# Patient Record
Sex: FEMALE | Race: WHITE | Employment: OTHER | URBAN - METROPOLITAN AREA
[De-identification: names, ages, dates, MRNs, and addresses within clinical notes are randomized per-mention and may not be internally consistent; named-entity substitution may affect disease eponyms.]

---

## 2017-07-26 ENCOUNTER — HOSPITAL ENCOUNTER (OUTPATIENT)
Dept: LAB | Age: 82
Discharge: HOME | End: 2017-07-26
Payer: MEDICARE

## 2017-07-26 DIAGNOSIS — E78.2 MIXED HYPERLIPIDEMIA: Chronic | ICD-10-CM

## 2017-07-26 DIAGNOSIS — I10 ESSENTIAL HYPERTENSION: ICD-10-CM

## 2017-07-26 LAB
ALBUMIN SERPL BCP-MCNC: 3.5 G/DL (ref 3.2–4.6)
ALBUMIN/GLOB SERPL: 1.1 {RATIO} (ref 1.2–3.5)
ALP SERPL-CCNC: 66 U/L (ref 50–136)
ALT SERPL-CCNC: 19 U/L (ref 12–65)
ANION GAP BLD CALC-SCNC: 6 MMOL/L (ref 7–16)
AST SERPL W P-5'-P-CCNC: 19 U/L (ref 15–37)
BILIRUB DIRECT SERPL-MCNC: 0.1 MG/DL
BILIRUB SERPL-MCNC: 0.5 MG/DL (ref 0.2–1.1)
BUN SERPL-MCNC: 13 MG/DL (ref 8–23)
CALCIUM SERPL-MCNC: 9.2 MG/DL (ref 8.3–10.4)
CHLORIDE SERPL-SCNC: 105 MMOL/L (ref 98–107)
CHOLEST SERPL-MCNC: 233 MG/DL
CO2 SERPL-SCNC: 30 MMOL/L (ref 21–32)
CREAT SERPL-MCNC: 0.92 MG/DL (ref 0.6–1)
GLOBULIN SER CALC-MCNC: 3.2 G/DL (ref 2.3–3.5)
GLUCOSE SERPL-MCNC: 87 MG/DL (ref 65–100)
HDLC SERPL-MCNC: 78 MG/DL (ref 40–60)
HDLC SERPL: 3 {RATIO}
LDLC SERPL CALC-MCNC: 142 MG/DL
LIPID PROFILE,FLP: ABNORMAL
POTASSIUM SERPL-SCNC: 4.9 MMOL/L (ref 3.5–5.1)
PROT SERPL-MCNC: 6.7 G/DL (ref 6.3–8.2)
SODIUM SERPL-SCNC: 141 MMOL/L (ref 136–145)
TRIGL SERPL-MCNC: 65 MG/DL (ref 35–150)
VLDLC SERPL CALC-MCNC: 13 MG/DL (ref 6–23)

## 2017-07-26 PROCEDURE — 80048 BASIC METABOLIC PNL TOTAL CA: CPT | Performed by: FAMILY MEDICINE

## 2017-07-26 PROCEDURE — 80061 LIPID PANEL: CPT | Performed by: FAMILY MEDICINE

## 2017-07-26 PROCEDURE — 80076 HEPATIC FUNCTION PANEL: CPT | Performed by: FAMILY MEDICINE

## 2017-07-26 PROCEDURE — 36415 COLL VENOUS BLD VENIPUNCTURE: CPT | Performed by: FAMILY MEDICINE

## 2017-07-28 NOTE — PROGRESS NOTES
Please inform patient/POA  that cholesterol slightly high. Cut back on fried and fatty foods result(s)  .

## 2017-09-07 ENCOUNTER — HOSPITAL ENCOUNTER (OUTPATIENT)
Dept: ULTRASOUND IMAGING | Age: 82
Discharge: HOME | End: 2017-09-07
Attending: FAMILY MEDICINE
Payer: MEDICARE

## 2017-09-07 DIAGNOSIS — M79.89 LEFT LEG SWELLING: ICD-10-CM

## 2017-09-07 PROCEDURE — 93971 EXTREMITY STUDY: CPT

## 2017-09-08 NOTE — PROGRESS NOTES
Pt notified per Dr. Shaggy Corona that her us result was normal. And that she will need a appt if swelling persists.

## 2017-09-18 ENCOUNTER — HOSPITAL ENCOUNTER (OUTPATIENT)
Dept: CT IMAGING | Age: 82
Discharge: HOME | End: 2017-09-18
Attending: FAMILY MEDICINE
Payer: MEDICARE

## 2017-09-18 DIAGNOSIS — C56.2 MALIGNANT NEOPLASM OF LEFT OVARY (HCC): ICD-10-CM

## 2017-09-18 DIAGNOSIS — M79.89 LEG SWELLING: ICD-10-CM

## 2017-09-18 PROCEDURE — 74011636320 HC RX REV CODE- 636/320: Performed by: FAMILY MEDICINE

## 2017-09-18 PROCEDURE — 74011000258 HC RX REV CODE- 258: Performed by: FAMILY MEDICINE

## 2017-09-18 PROCEDURE — 74177 CT ABD & PELVIS W/CONTRAST: CPT

## 2017-09-18 RX ORDER — SODIUM CHLORIDE 0.9 % (FLUSH) 0.9 %
10 SYRINGE (ML) INJECTION
Status: COMPLETED | OUTPATIENT
Start: 2017-09-18 | End: 2017-09-18

## 2017-09-18 RX ADMIN — DIATRIZOATE MEGLUMINE AND DIATRIZOATE SODIUM 15 ML: 660; 100 LIQUID ORAL; RECTAL at 14:56

## 2017-09-18 RX ADMIN — Medication 10 ML: at 14:56

## 2017-09-18 RX ADMIN — SODIUM CHLORIDE 100 ML: 900 INJECTION, SOLUTION INTRAVENOUS at 14:56

## 2017-09-18 RX ADMIN — IOPAMIDOL 100 ML: 755 INJECTION, SOLUTION INTRAVENOUS at 14:55

## 2017-10-26 ENCOUNTER — HOSPITAL ENCOUNTER (OUTPATIENT)
Dept: MAMMOGRAPHY | Age: 82
Discharge: HOME | End: 2017-10-26
Attending: FAMILY MEDICINE
Payer: MEDICARE

## 2017-10-26 DIAGNOSIS — Z12.31 VISIT FOR SCREENING MAMMOGRAM: ICD-10-CM

## 2017-10-26 PROCEDURE — 77067 SCR MAMMO BI INCL CAD: CPT

## 2017-11-14 PROBLEM — I89.0 LYMPHEDEMA: Status: ACTIVE | Noted: 2017-11-14

## 2017-11-29 ENCOUNTER — HOSPITAL ENCOUNTER (OUTPATIENT)
Dept: PHYSICAL THERAPY | Age: 82
Discharge: HOME | End: 2017-11-29
Payer: MEDICARE

## 2017-11-29 DIAGNOSIS — I89.0 LYMPHEDEMA: ICD-10-CM

## 2017-11-29 PROCEDURE — G8990 OTHER PT/OT CURRENT STATUS: HCPCS

## 2017-11-29 PROCEDURE — G8991 OTHER PT/OT GOAL STATUS: HCPCS

## 2017-11-29 PROCEDURE — 97166 OT EVAL MOD COMPLEX 45 MIN: CPT

## 2017-11-29 PROCEDURE — 97140 MANUAL THERAPY 1/> REGIONS: CPT

## 2017-11-29 NOTE — THERAPY EVALUATION
Amira Gould  : 1935  [place]  Secondary: [address] at [facility]  [address], [facility] [address]  [phone]   [phone]         OUTPATIENT OCCUPATIONAL THERAPY: Initial Assessment and Daily Note 2017    ICD-10: Treatment Diagnosis: I89.0, lymphedema not elsewhere classified  Precautions/Allergies:   Review of patient's allergies indicates no known allergies. Fall Risk Score: 0 (? 5 = High Risk)  MD Orders: Evaluate and treat MEDICAL/REFERRING DIAGNOSIS:   Lymphedema [I89.0]   DATE OF ONSET:  Chronic   REFERRING PHYSICIAN: Miguel Hadren NP  RETURN PHYSICIAN APPOINTMENT: Not provided     INITIAL ASSESSMENT:  Ms. Zack Wetzel presents with L LE lymphedema, with reported increase in swelling the past 4 months. Pt stated she had ovarian cancer in  with removal of lymph nodes, chemotherapy but no radiation. She has history of R total knee replacement. She recently saw a vein specialist, who sent her to us for complete decongestive therapy. Upon medical record review, pt had lymphedema therapy evaluation in , at which time she reported having swelling in her left leg for \"8 years. \"  Pt and  reported that pt has not had any treatment for her lymphedema. She presents with hyperkeratosis, fibrosis, 3+/5 pitting edema. Pt has purchased thigh highs but has difficulty donning and doffing the stockings. They are here to learn to manage lymphedema, reduce girth and improve functional mobility. PLAN OF CARE:   PROBLEM LIST:  1.  Edema/girth  2. Limb heaviness   INTERVENTIONS PLANNED:  Hygiene training; skin integrity management  Manual therapy training; manual lymphatic drainage  Therapeutic exercise  Multilayer bandaging  Compression garment fitting and management  Kineseotape/compression pump prn     TREATMENT PLAN:  Effective Dates: 17 TO 18.   Frequency/Duration:  Two times a week for 12 weeks; upon reassessment will adjust frequency and duration as necessary. GOALS: (Goals have been discussed and agreed upon with patient.)  Short-Term Functional Goals: Time Frame: 6 weeks  1. Patient will verbalize understanding of lymphedema precautions  2. Patient will be independent in skin care to reduce risk of cellulitis  3. Patient will be independent with home exercise program  4. Patient will tolerate multilayer compression bandaging to aid in edema reduction  Discharge Goals: Time Frame: 12 weeks  1..  Pt will have reduction and stabilization of circumferencial volumetric graph measurements left lower extremeity  1. Patient is independent with donning and doffing compression garments for long term management  3. Patient is independent with home management of lymphedema    Rehabilitation Potential For Stated Goals: Good  Regarding Lisa Shaikh's therapy, I certify that the treatment plan above will be carried out by a therapist or under their direction. Thank you for this referral,  Lamine Lacy, OTR/L, CLT     Referring Physician Signature: Adi Harden NP _________________________  Date _________            The information in this section was collected on 11/29/2017   (except where otherwise noted). OCCUPATIONAL PROFILE & HISTORY:   History of Present Injury/Illness (Reason for Referral):   Ms. Juliocesar Ferreira presents with L LE lymphedema, with reported increase in swelling the past 4 months. Pt stated she had ovarian cancer in 2000 with chemotherapy but no radiation. She has history of R total knee replacement. She recently saw a vein specialist, who sent her to us for complete decongestive therapy. Upon medical record review, pt had lymphedema treatment in 2014, at which time she reported having swelling in her left leg for \"8 years. \"  Pt and  reported that pt has not had any treatment for her lymphedema and that it has been present for 4 to 6 months.   Pt has purchased thigh highs but has difficulty donning and doffing the stockings. They are here to learn to manage lymphedema, reduce girth and improve functional mobility. Past Medical History/Comorbidities:   Ms. Tran Wolfe  has a past medical history of Childhood asthma; Chronic pain of right knee; Cognitive impairment; DJD (degenerative joint disease) of knee; H/O: whooping cough; History of ovarian cancer (1999); History of scarlet fever (\"one day\" as a child); Hypertension; Memory loss; and Sigmoid diverticulosis. She also has no past medical history of Adverse effect of anesthesia; Difficult intubation; Malignant hyperthermia due to anesthesia; Nausea & vomiting; or Pseudocholinesterase deficiency. Ms. Tran Wolfe  has a past surgical history that includes cholecystectomy; appendectomy; total abdominal hysterectomy; tonsillectomy (1948); knee arthroscopy (Right, 2016); and knee replacement (Right, 12/2016). Social History/Living Environment:   Pt lives with her spouse. She swims 2-3 days a week  Prior Level of Function/Work/Activity:  Independent; retired    Current Medications:    Current Outpatient Prescriptions:     lisinopril (PRINIVIL, ZESTRIL) 20 mg tablet, TAKE ONE TABLET BY MOUTH ONCE DAILY, Disp: 90 Tab, Rfl: 3    Comp. Stocking,Thigh,Reg,Medium misc, One pair thigh high stocking- please measure to fit, Disp: 1 Box, Rfl: 1    CALCIUM CARBONATE/VITAMIN D3 (CALCIUM + D PO), Take  by mouth every morning. Indications: hold until after surgery, Disp: , Rfl:     ranitidine (ZANTAC) 150 mg tablet, Take 150 mg by mouth two (2) times a day., Disp: , Rfl:     ascorbic acid (VITAMIN C) 500 mg tablet, Take 500 mg by mouth every morning. Indications: hold until after surgery, Disp: , Rfl:     multivitamin (ONE A DAY) tablet, Take 1 Tab by mouth every morning. Indications: hold until after surgery, Disp: , Rfl:     CYANOCOBALAMIN, VITAMIN B-12, (VITAMIN B-12 PO), Take  by mouth every morning.  Indications: hold until after surgery, Disp: , Rfl:     GLUCOSAMINE HCL/CHONDR CARTER A NA (GLUCOSAMINE-CHONDROITIN) 750-600 mg tab, Take  by mouth every morning. Indications: hold until after surgery, Disp: , Rfl:     aspirin 81 mg tablet, Take 81 mg by mouth every morning. Indications: continue, Disp: , Rfl:             Date Last Reviewed:  11/29/2017     Complexity Level : Expanded review of therapy/medical records (1-2):  MODERATE COMPLEXITY   ASSESSMENT OF OCCUPATIONAL PERFORMANCE:   Palpation:          Pt with 3+/5 pitting edema L LE; edema foot to above knee. Skin Integrity:          Pt with  hyperkeratosis and lymphostatic fibrosis, No hyperpigmentation, ulceration or weeping. Edema/Girth:  3+ and pitting    Left Right    Initial Most Recent Initial Most Recent   Lower  Extremity  189.5 cm  Foot to above knee circumferencial volumetric measuements                   Physical Skills Involved:  1. Edema  2. Skin Integrity  3. Limb heaviness Cognitive Skills Affected (resulting in the inability to perform in a timely and safe manner):  1. Short Term Recall Psychosocial Skills Affected:  1. Habits/Routines   Number of elements that affect the Plan of Care: 3-5:  MODERATE COMPLEXITY   CLINICAL DECISION MAKING:   Outcome Measure: Tool Used: Lymphedema Life Impact Scale   Score:  Initial: 0 Most Recent: X (Date: -- )   Interpretation of Score: The Lymphedema Life Impact Scale (LLIS) is a validated instrument that measures the physical, functional, and psychosocial concerns pertinent to patients with extremity lymphedema. The Scale's questionnaire is administered to patients to gauge impairments, activity limitations, and participation restrictions resulting from their lymphedema. Score 0 1-13 14-26 27-40 41-54 55-67 68   Modifier CH CI CJ CK CL CM CN     ?  Other PT/OT Primary Functional Limitations:     - CURRENT STATUS:  - 0% impaired, limited or restricted    - GOAL STATUS: CH - 0% impaired, limited or restricted    - D/C STATUS:  ---------------To be determined---------------      Medical Necessity:   · Patient is expected to demonstrate progress in reduction and stabilization of L LE lymphedema to decrease risk for cellulitis. Reason for Services/Other Comments:  · Patient continues to require skilled intervention due to stage 2 lymphedema L LE. Assessment process, impact of co-morbidities, assessment modification\need for assistance, and selection of interventions: Analytical Complexity:MODERATE COMPLEXITY   TREATMENT:   (In addition to Assessment/Re-Assessment sessions the following treatments were rendered)    Pre-treatment Symptoms/Complaints:  Pt presents with increased girth, limb heaviness effecting functional mobility  Pain: Initial: Pain Intensity 1: 1  Post Session:  1       Occupational Therapy Evaluation (x) OT eval completed. Pt received information on lymphedema and risk reduction/self management practices as outlined by the National Lymphedema Network. Therapeutic Exercise: ():      Manual Therapy: (30 minutes):   Pt was educated on lymphatic pathophysiology, complete decongestive therapy, precautions and skin integrity management. Provided surgigrip stockinette for compression as pt is going out of town and will return for therapy December 11. Pt has thigh high hose to wear for compression; pt/spouse verbalized understanding of precautions and skin integrity management. Pt in agreement with POC and will begin treatment the week of Dec 11th. Treatment/Session Assessment:    · Response to Treatment:  Tolerated without complication. In agreement with POC. · Compliance with Program/Exercises: Will assess as treatment progresses. · Recommendations/Intent for next treatment session: \"Next visit will focus on complete decongestive therapy\".   Total Treatment Duration:OT Patient Time In/Time Out  Time In: 1000  Time Out: [address] [place] [address]

## 2017-12-12 ENCOUNTER — HOSPITAL ENCOUNTER (OUTPATIENT)
Dept: PHYSICAL THERAPY | Age: 82
Discharge: HOME | End: 2017-12-12
Payer: MEDICARE

## 2017-12-12 PROCEDURE — 97140 MANUAL THERAPY 1/> REGIONS: CPT

## 2017-12-12 NOTE — PROGRESS NOTES
Luis Antonio Rivera  : 1935  [place]  Secondary: [address] at [facility]  [address], [address]  [phone]   [phone]         OUTPATIENT OCCUPATIONAL THERAPY: Daily Note 2017    ICD-10: Treatment Diagnosis: I89.0, lymphedema not elsewhere classified  Precautions/Allergies:   Review of patient's allergies indicates no known allergies. Fall Risk Score: 0 (? 5 = High Risk)  MD Orders: Evaluate and treat MEDICAL/REFERRING DIAGNOSIS:   Lymphedema, not elsewhere classified [I89.0]   DATE OF ONSET:  Chronic   REFERRING PHYSICIAN: Darien Harden NP  RETURN PHYSICIAN APPOINTMENT: Not provided     INITIAL ASSESSMENT:  Ms. Freddy Villalpando presents with L LE lymphedema, with reported increase in swelling the past 4 months. Pt stated she had ovarian cancer in  with chemotherapy but no radiation. She has history of R total knee replacement. She recently saw a vein specialist, who sent her to us for complete decongestive therapy. Upon medical record review, pt had lymphedema treatment in , at which time she reported having swelling in her left leg for \"8 years. \"  Pt and  reported that pt has not had any treatment for her lymphedema and that it has been present for 4 to 6 months. Pt has purchased thigh highs but has difficulty donning and doffing the stockings. They are here to learn to manage lymphedema, reduce girth and improve functional mobility. PLAN OF CARE:   PROBLEM LIST:  1.  Edema/girth  2. Limb heaviness   INTERVENTIONS PLANNED:  Hygiene training; skin integrity management  Manual therapy training; manual lymphatic drainage  Therapeutic exercise  Multilayer bandaging  Compression garment fitting and management  Kineseotape/compression pump prn     TREATMENT PLAN:  Effective Dates: 17 TO 18.   Frequency/Duration:  Two times a week for 12 weeks; upon reassessment will adjust frequency and duration as necessary. GOALS: (Goals have been discussed and agreed upon with patient.)  Short-Term Functional Goals: Time Frame: 6 weeks  1. Patient will verbalize understanding of lymphedema precautions  2. Patient will be independent in skin care to reduce risk of cellulitis  3. Patient will be independent with home exercise program  4. Patient will tolerate multilayer compression bandaging to aid in edema reduction  Discharge Goals: Time Frame: 12 weeks  1..  Pt will have reduction and stabilization of circumferencial volumetric graph measurements left lower extremeity  1. Patient is independent with donning and doffing compression garments for long term management  3. Patient is independent with home management of lymphedema    Rehabilitation Potential For Stated Goals: Good  Regarding Dev Shaikh's therapy, I certify that the treatment plan above will be carried out by a therapist or under their direction. Thank you for this referral,  Madisyn Isidro OTR/L, CLT     Referring Physician Signature: Jennifer Harden, DONNELL _________________________  Date _________            The information in this section was collected on 12/12/2017   (except where otherwise noted). OCCUPATIONAL PROFILE & HISTORY:   History of Present Injury/Illness (Reason for Referral):   Ms. Carlo Garcia presents with L LE lymphedema, with reported increase in swelling the past 4 months. Pt stated she had ovarian cancer in 2000 with chemotherapy but no radiation. She has history of R total knee replacement. She recently saw a vein specialist, who sent her to us for complete decongestive therapy. Upon medical record review, pt had lymphedema treatment in 2014, at which time she reported having swelling in her left leg for \"8 years. \"  Pt and  reported that pt has not had any treatment for her lymphedema and that it has been present for 4 to 6 months.   Pt has purchased thigh highs but has difficulty donning and doffing the stockings. They are here to learn to manage lymphedema, reduce girth and improve functional mobility. Past Medical History/Comorbidities:   Ms. Rachael Hwang  has a past medical history of Childhood asthma; Chronic pain of right knee; Cognitive impairment; DJD (degenerative joint disease) of knee; H/O: whooping cough; History of ovarian cancer (1999); History of scarlet fever (\"one day\" as a child); Hypertension; Memory loss; and Sigmoid diverticulosis. She also has no past medical history of Adverse effect of anesthesia; Difficult intubation; Malignant hyperthermia due to anesthesia; Nausea & vomiting; or Pseudocholinesterase deficiency. Ms. Rachael Hwang  has a past surgical history that includes cholecystectomy; appendectomy; total abdominal hysterectomy; tonsillectomy (1948); knee arthroscopy (Right, 2016); and knee replacement (Right, 12/2016). Social History/Living Environment:   Pt lives with her spouse. She swims 2-3 days a week  Prior Level of Function/Work/Activity:  Independent; retired    Current Medications:    Current Outpatient Prescriptions:     donepezil (ARICEPT) 10 mg tablet, Take 10 mg by mouth nightly., Disp: , Rfl:     lisinopril (PRINIVIL, ZESTRIL) 20 mg tablet, TAKE ONE TABLET BY MOUTH ONCE DAILY, Disp: 90 Tab, Rfl: 3    Comp. Stocking,Thigh,Reg,Medium misc, One pair thigh high stocking- please measure to fit, Disp: 1 Box, Rfl: 1    CALCIUM CARBONATE/VITAMIN D3 (CALCIUM + D PO), Take  by mouth every morning. Indications: hold until after surgery, Disp: , Rfl:     ranitidine (ZANTAC) 150 mg tablet, Take 150 mg by mouth two (2) times a day., Disp: , Rfl:     ascorbic acid (VITAMIN C) 500 mg tablet, Take 500 mg by mouth every morning. Indications: hold until after surgery, Disp: , Rfl:     multivitamin (ONE A DAY) tablet, Take 1 Tab by mouth every morning.  Indications: hold until after surgery, Disp: , Rfl:     CYANOCOBALAMIN, VITAMIN B-12, (VITAMIN B-12 PO), Take  by mouth every morning. Indications: hold until after surgery, Disp: , Rfl:     GLUCOSAMINE HCL/CHONDR CARTER A NA (GLUCOSAMINE-CHONDROITIN) 750-600 mg tab, Take  by mouth every morning. Indications: hold until after surgery, Disp: , Rfl:     aspirin 81 mg tablet, Take 81 mg by mouth every morning. Indications: continue, Disp: , Rfl:             Date Last Reviewed:  12/12/2017     Complexity Level : Expanded review of therapy/medical records (1-2):  MODERATE COMPLEXITY   ASSESSMENT OF OCCUPATIONAL PERFORMANCE:   Palpation:          Pt with 3+/5 pitting edema L LE; edema foot to above knee. Skin Integrity:          Pt with no hyperpigmentation, fibrosis, ulceration or weeping. Edema/Girth:  3+ and pitting    Left Right    Initial Most Recent Initial Most Recent   Lower  Extremity  189.5 cm  Foot to above knee circumferencial volumetric measuements                   Physical Skills Involved:  1. Edema  2. Skin Integrity  3. Limb heaviness Cognitive Skills Affected (resulting in the inability to perform in a timely and safe manner):  1. Short Term Recall Psychosocial Skills Affected:  1. Habits/Routines   Number of elements that affect the Plan of Care: 3-5:  MODERATE COMPLEXITY   CLINICAL DECISION MAKING:   Outcome Measure: Tool Used: Lymphedema Life Impact Scale   Score:  Initial: 0 Most Recent: X (Date: -- )   Interpretation of Score: The Lymphedema Life Impact Scale (LLIS) is a validated instrument that measures the physical, functional, and psychosocial concerns pertinent to patients with extremity lymphedema. The Scale's questionnaire is administered to patients to gauge impairments, activity limitations, and participation restrictions resulting from their lymphedema. Score 0 1-13 14-26 27-40 41-54 55-67 68   Modifier CH CI CJ CK CL CM CN     ?  Other PT/OT Primary Functional Limitations:     - CURRENT STATUS: CH - 0% impaired, limited or restricted    - GOAL STATUS: CH - 0% impaired, limited or restricted    - D/C STATUS:  ---------------To be determined---------------      Medical Necessity:   · Patient is expected to demonstrate progress in reduction and stabilization of L LE lymphedema to decrease risk for cellulitis. Reason for Services/Other Comments:  · Patient continues to require skilled intervention due to stage 1 L LE lymphedema. Assessment process, impact of co-morbidities, assessment modification\need for assistance, and selection of interventions: Analytical Complexity:MODERATE COMPLEXITY   TREATMENT:   (In addition to Assessment/Re-Assessment sessions the following treatments were rendered)    Pre-treatment Symptoms/Complaints:  Pt presents with increased girth, limb heaviness effecting functional mobility  Pain: Initial: Pain Intensity 1: 0  Post Session:  0       Occupational Therapy Evaluation (x) OT eval completed. Pt received information on lymphedema and risk reduction/self management practices as outlined by the National Lymphedema Network. Therapeutic Exercise: ():      Manual Therapy:nn  60 minutes): Jp Pascal Pt arrived with thigh high on. Manual lymphatic drainage with facilitation of cervical, axillary, inguinal collectors prior to decongestion of L LE. Pump trial x 15 minutes. Pt may benefit from pump for home use due to chronic nature of lymphedema in L LE. Multilayer bandaging using surgigrip and 2 short stretch bandages from foot to knee. Pt to wear until Thursday or Friday, and remove for aquatic exercises. Pt will wear thigh high and RTC next week 2x a week for complete decongestive therapy . Treatment/Session Assessment:    · Response to Treatment:  Tolerated without complication. Pt has purchased Eucerin lotion and is using at home. Interested in pump for home use; will contact pump rep for follow through with request.  · Compliance with Program/Exercises:  Good compliance  · Recommendations/Intent for next treatment session:  \"Next visit will focus on complete decongestive therapy\".   Total Treatment Duration:OT Patient Time In/Time Out  Time In: 1200  Time Out: Delilah Wen 96, OT

## 2017-12-20 ENCOUNTER — HOSPITAL ENCOUNTER (OUTPATIENT)
Dept: PHYSICAL THERAPY | Age: 82
Discharge: HOME | End: 2017-12-20
Payer: MEDICARE

## 2017-12-20 PROCEDURE — 97140 MANUAL THERAPY 1/> REGIONS: CPT

## 2017-12-20 NOTE — PROGRESS NOTES
Aliya Lozada  : 1935  [place]  Secondary: [address] at [facility]  [address], [address]  [phone]   senior [phone]         OUTPATIENT OCCUPATIONAL THERAPY: Daily Note 2017    ICD-10: Treatment Diagnosis: I89.0, lymphedema not elsewhere classified  Precautions/Allergies:   Review of patient's allergies indicates no known allergies. Fall Risk Score: 0 (? 5 = High Risk)  MD Orders: Evaluate and treat MEDICAL/REFERRING DIAGNOSIS:   Lymphedema, not elsewhere classified [I89.0]   DATE OF ONSET:  Chronic   REFERRING PHYSICIAN: Jennifer Harden NP  RETURN PHYSICIAN APPOINTMENT: Not provided     INITIAL ASSESSMENT:   Ms. Carlo Garcia presents with L LE lymphedema, with reported increase in swelling the past 4 months. Pt stated she had ovarian cancer in  with removal of lymph nodes, chemotherapy but no radiation. She has history of R total knee replacement. She recently saw a vein specialist, who sent her to us for complete decongestive therapy. Upon medical record review, pt had lymphedema therapy evaluation in , at which time she reported having swelling in her left leg for \"8 years. \"  Pt and  reported that pt has not had any treatment for her lymphedema. She presents with hyperkeratosis, fibrosis, 3+/5 pitting edema. Pt has purchased thigh highs but has difficulty donning and doffing the stockings. They are here to learn to manage lymphedema, reduce girth and improve functional mobility. PLAN OF CARE:   PROBLEM LIST:  1.  Edema/girth  2. Limb heaviness   INTERVENTIONS PLANNED:  Hygiene training; skin integrity management  Manual therapy training; manual lymphatic drainage  Therapeutic exercise  Multilayer bandaging  Compression garment fitting and management  Kineseotape/compression pump prn     TREATMENT PLAN:  Effective Dates: 17 TO 18.   Frequency/Duration:  Two times a week for 12 weeks; upon reassessment will adjust frequency and duration as necessary. GOALS: (Goals have been discussed and agreed upon with patient.)  Short-Term Functional Goals: Time Frame: 6 weeks  1. Patient will verbalize understanding of lymphedema precautions  2. Patient will be independent in skin care to reduce risk of cellulitis  3. Patient will be independent with home exercise program  4. Patient will tolerate multilayer compression bandaging to aid in edema reduction  Discharge Goals: Time Frame: 12 weeks  1..  Pt will have reduction and stabilization of circumferencial volumetric graph measurements left lower extremeity  1. Patient is independent with donning and doffing compression garments for long term management  3. Patient is independent with home management of lymphedema    Rehabilitation Potential For Stated Goals: Gregg Oquendo, OTR/L, CLT                 The information in this section was collected on 12/20/2017   (except where otherwise noted). OCCUPATIONAL PROFILE & HISTORY:   History of Present Injury/Illness (Reason for Referral):   Ms. Lindsay Anton presents with L LE lymphedema, with reported increase in swelling the past 4 months. Pt stated she had ovarian cancer in 2000 with chemotherapy but no radiation. She has history of R total knee replacement. She recently saw a vein specialist, who sent her to us for complete decongestive therapy. Upon medical record review, pt had lymphedema treatment in 2014, at which time she reported having swelling in her left leg for \"8 years. \"  Pt and  reported that pt has not had any treatment for her lymphedema and that it has been present for 4 to 6 months. Pt has purchased thigh highs but has difficulty donning and doffing the stockings. They are here to learn to manage lymphedema, reduce girth and improve functional mobility.     Past Medical History/Comorbidities:   Ms. Lindsay Anton  has a past medical history of Childhood asthma; Chronic pain of right knee; Cognitive impairment; DJD (degenerative joint disease) of knee; H/O: whooping cough; History of ovarian cancer (1999); History of scarlet fever (\"one day\" as a child); Hypertension; Memory loss; and Sigmoid diverticulosis. She also has no past medical history of Adverse effect of anesthesia; Difficult intubation; Malignant hyperthermia due to anesthesia; Nausea & vomiting; or Pseudocholinesterase deficiency. Ms. Freddy Villalpando  has a past surgical history that includes hx cholecystectomy; hx appendectomy; hx total abdominal hysterectomy; hx tonsillectomy (1948); hx knee arthroscopy (Right, 2016); and hx knee replacement (Right, 12/2016). Social History/Living Environment:   Pt lives with her spouse. She swims 2-3 days a week  Prior Level of Function/Work/Activity:  Independent; retired    Current Medications:    Current Outpatient Prescriptions:     donepezil (ARICEPT) 10 mg tablet, Take 10 mg by mouth nightly., Disp: , Rfl:     lisinopril (PRINIVIL, ZESTRIL) 20 mg tablet, TAKE ONE TABLET BY MOUTH ONCE DAILY, Disp: 90 Tab, Rfl: 3    Comp. Stocking,Thigh,Reg,Medium misc, One pair thigh high stocking- please measure to fit, Disp: 1 Box, Rfl: 1    CALCIUM CARBONATE/VITAMIN D3 (CALCIUM + D PO), Take  by mouth every morning. Indications: hold until after surgery, Disp: , Rfl:     ranitidine (ZANTAC) 150 mg tablet, Take 150 mg by mouth two (2) times a day., Disp: , Rfl:     ascorbic acid (VITAMIN C) 500 mg tablet, Take 500 mg by mouth every morning. Indications: hold until after surgery, Disp: , Rfl:     multivitamin (ONE A DAY) tablet, Take 1 Tab by mouth every morning. Indications: hold until after surgery, Disp: , Rfl:     CYANOCOBALAMIN, VITAMIN B-12, (VITAMIN B-12 PO), Take  by mouth every morning. Indications: hold until after surgery, Disp: , Rfl:     GLUCOSAMINE HCL/CHONDR CARTER A NA (GLUCOSAMINE-CHONDROITIN) 750-600 mg tab, Take  by mouth every morning.  Indications: hold until after surgery, Disp: , Rfl:     aspirin 81 mg tablet, Take 81 mg by mouth every morning. Indications: continue, Disp: , Rfl:             Date Last Reviewed:  12/20/2017     Complexity Level : Expanded review of therapy/medical records (1-2):  MODERATE COMPLEXITY   ASSESSMENT OF OCCUPATIONAL PERFORMANCE:   Palpation:          Pt with 3+/5 pitting edema L LE; edema foot to above knee. Skin Integrity:          Pt with hyperkeratosis, lymphostatic fibrosis;  no hyperpigmentation, ulceration or weeping. Edema/Girth:  3+ and pitting    Left Right    Initial Most Recent Initial Most Recent   Lower  Extremity  189.5 cm  Foot to above knee circumferencial volumetric measuements                   Physical Skills Involved:  1. Edema  2. Skin Integrity  3. Limb heaviness Cognitive Skills Affected (resulting in the inability to perform in a timely and safe manner):  1. Short Term Recall Psychosocial Skills Affected:  1. Habits/Routines   Number of elements that affect the Plan of Care: 3-5:  MODERATE COMPLEXITY   CLINICAL DECISION MAKING:   Outcome Measure: Tool Used: Lymphedema Life Impact Scale   Score:  Initial: 0 Most Recent: X (Date: -- )   Interpretation of Score: The Lymphedema Life Impact Scale (LLIS) is a validated instrument that measures the physical, functional, and psychosocial concerns pertinent to patients with extremity lymphedema. The Scale's questionnaire is administered to patients to gauge impairments, activity limitations, and participation restrictions resulting from their lymphedema. Score 0 1-13 14-26 27-40 41-54 55-67 68   Modifier CH CI CJ CK CL CM CN     ?  Other PT/OT Primary Functional Limitations:     - CURRENT STATUS:  - 0% impaired, limited or restricted    - GOAL STATUS: CH - 0% impaired, limited or restricted    - D/C STATUS:  ---------------To be determined---------------      Medical Necessity:   · Patient is expected to demonstrate progress in reduction and stabilization of L LE lymphedema to decrease risk for cellulitis. Reason for Services/Other Comments:  · Patient continues to require skilled intervention due to stage 2 L LE lymphedema. Assessment process, impact of co-morbidities, assessment modification\need for assistance, and selection of interventions: Analytical Complexity:MODERATE COMPLEXITY   TREATMENT:   (In addition to Assessment/Re-Assessment sessions the following treatments were rendered)    Pre-treatment Symptoms/Complaints:  Pt presents with increased girth, limb heaviness effecting functional mobility  Pain: Initial: Pain Intensity 1: 0  Post Session:  0       Occupational Therapy Evaluation (x) OT eval completed. Pt received information on lymphedema and risk reduction/self management practices as outlined by the National Lymphedema Network. Therapeutic Exercise: ():      Manual Therapy:nn  60 minutes): Jamison Eng Pt arrived with thigh high on. Manual lymphatic drainage with facilitation of cervical, axillary, inguinal collectors prior to decongestion of L LE. Pump trial x 15 minutes. Pt may benefit from pump for home use due to chronic nature of lymphedema in L LE. Multilayer bandaging using surgigrip and 2 short stretch bandages from foot to knee. Pt to wear until Thursday and remove for aquatic exercises. Pt will wear thigh high and RTC Friday for complete decongestive therapy . Treatment/Session Assessment:    · Response to Treatment:  Tolerated without complication. Pt has purchased Eucerin lotion and is using at home. Interested in pump for home use; will contact pump rep for follow through with request.  · Compliance with Program/Exercises:  Good compliance  · Recommendations/Intent for next treatment session: \"Next visit will focus on complete decongestive therapy\".   Total Treatment Duration:OT Patient Time In/Time Out  Time In: 1200  Time Out: Delilah Wen 96, OT

## 2017-12-22 ENCOUNTER — HOSPITAL ENCOUNTER (OUTPATIENT)
Dept: PHYSICAL THERAPY | Age: 82
Discharge: HOME | End: 2017-12-22
Payer: MEDICARE

## 2017-12-22 PROCEDURE — 97140 MANUAL THERAPY 1/> REGIONS: CPT

## 2017-12-22 NOTE — PROGRESS NOTES
Isrrael Garcia  : 1935  [place]  Secondary: [address] at [place] [address] Therapy  [address] [place] [address], [facility] [address]  [phone]   [phone]         OUTPATIENT OCCUPATIONAL THERAPY: Daily Note 2017    ICD-10: Treatment Diagnosis: I89.0, lymphedema not elsewhere classified  Precautions/Allergies:   Review of patient's allergies indicates no known allergies. Fall Risk Score: 0 (? 5 = High Risk)  MD Orders: Evaluate and treat MEDICAL/REFERRING DIAGNOSIS:   Lymphedema, not elsewhere classified [I89.0]   DATE OF ONSET:  Chronic   REFERRING PHYSICIAN: Jamil Harden NP  RETURN PHYSICIAN APPOINTMENT: Not provided     INITIAL ASSESSMENT:   Ms. Maurisio Bravo presents with L LE lymphedema, with reported increase in swelling the past 4 months. Pt stated she had ovarian cancer in  with removal of lymph nodes, chemotherapy but no radiation. She has history of R total knee replacement. She recently saw a vein specialist, who sent her to us for complete decongestive therapy. Upon medical record review, pt had lymphedema therapy evaluation in , at which time she reported having swelling in her left leg for \"8 years. \"  Pt and  reported that pt has not had any treatment for her lymphedema. She presents with hyperkeratosis, fibrosis, 3+/5 pitting edema. Pt has purchased thigh highs but has difficulty donning and doffing the stockings. They are here to learn to manage lymphedema, reduce girth and improve functional mobility. PLAN OF CARE:   PROBLEM LIST:  1.  Edema/girth  2. Limb heaviness   INTERVENTIONS PLANNED:  Hygiene training; skin integrity management  Manual therapy training; manual lymphatic drainage  Therapeutic exercise  Multilayer bandaging  Compression garment fitting and management  Kineseotape/compression pump prn     TREATMENT PLAN:  Effective Dates: 17 TO 18.   Frequency/Duration:  Two times a week for 12 weeks; upon reassessment will adjust frequency and duration as necessary. GOALS: (Goals have been discussed and agreed upon with patient.)  Short-Term Functional Goals: Time Frame: 6 weeks  1. Patient will verbalize understanding of lymphedema precautions  2. Patient will be independent in skin care to reduce risk of cellulitis  3. Patient will be independent with home exercise program  4. Patient will tolerate multilayer compression bandaging to aid in edema reduction  Discharge Goals: Time Frame: 12 weeks  1..  Pt will have reduction and stabilization of circumferencial volumetric graph measurements left lower extremeity  1. Patient is independent with donning and doffing compression garments for long term management  3. Patient is independent with home management of lymphedema    Rehabilitation Potential For Stated Goals: Gregg Newton, OTR/L, CLT                 The information in this section was collected on 12/22/2017   (except where otherwise noted). OCCUPATIONAL PROFILE & HISTORY:   History of Present Injury/Illness (Reason for Referral):   Ms. Tere Lopez presents with L LE lymphedema, with reported increase in swelling the past 4 months. Pt stated she had ovarian cancer in 2000 with chemotherapy but no radiation. She has history of R total knee replacement. She recently saw a vein specialist, who sent her to us for complete decongestive therapy. Upon medical record review, pt had lymphedema treatment in 2014, at which time she reported having swelling in her left leg for \"8 years. \"  Pt and  reported that pt has not had any treatment for her lymphedema and that it has been present for 4 to 6 months. Pt has purchased thigh highs but has difficulty donning and doffing the stockings. They are here to learn to manage lymphedema, reduce girth and improve functional mobility.     Past Medical History/Comorbidities:   Ms. Tere Lopez  has a past medical history of Childhood asthma; Chronic pain of right knee; Cognitive impairment; DJD (degenerative joint disease) of knee; H/O: whooping cough; History of ovarian cancer (1999); History of scarlet fever (\"one day\" as a child); Hypertension; Memory loss; and Sigmoid diverticulosis. She also has no past medical history of Adverse effect of anesthesia; Difficult intubation; Malignant hyperthermia due to anesthesia; Nausea & vomiting; or Pseudocholinesterase deficiency. Ms. Mary Dominguez  has a past surgical history that includes hx cholecystectomy; hx appendectomy; hx total abdominal hysterectomy; hx tonsillectomy (1948); hx knee arthroscopy (Right, 2016); and hx knee replacement (Right, 12/2016). Social History/Living Environment:   Pt lives with her spouse. She swims 2-3 days a week  Prior Level of Function/Work/Activity:  Independent; retired    Current Medications:    Current Outpatient Prescriptions:     donepezil (ARICEPT) 10 mg tablet, Take 10 mg by mouth nightly., Disp: , Rfl:     lisinopril (PRINIVIL, ZESTRIL) 20 mg tablet, TAKE ONE TABLET BY MOUTH ONCE DAILY, Disp: 90 Tab, Rfl: 3    Comp. Stocking,Thigh,Reg,Medium misc, One pair thigh high stocking- please measure to fit, Disp: 1 Box, Rfl: 1    CALCIUM CARBONATE/VITAMIN D3 (CALCIUM + D PO), Take  by mouth every morning. Indications: hold until after surgery, Disp: , Rfl:     ranitidine (ZANTAC) 150 mg tablet, Take 150 mg by mouth two (2) times a day., Disp: , Rfl:     ascorbic acid (VITAMIN C) 500 mg tablet, Take 500 mg by mouth every morning. Indications: hold until after surgery, Disp: , Rfl:     multivitamin (ONE A DAY) tablet, Take 1 Tab by mouth every morning. Indications: hold until after surgery, Disp: , Rfl:     CYANOCOBALAMIN, VITAMIN B-12, (VITAMIN B-12 PO), Take  by mouth every morning. Indications: hold until after surgery, Disp: , Rfl:     GLUCOSAMINE HCL/CHONDR CARTER A NA (GLUCOSAMINE-CHONDROITIN) 750-600 mg tab, Take  by mouth every morning.  Indications: hold until after surgery, Disp: , Rfl:     aspirin 81 mg tablet, Take 81 mg by mouth every morning. Indications: continue, Disp: , Rfl:             Date Last Reviewed:  12/22/2017     Complexity Level : Expanded review of therapy/medical records (1-2):  MODERATE COMPLEXITY   ASSESSMENT OF OCCUPATIONAL PERFORMANCE:   Palpation:          Pt with 3+/5 pitting edema L LE; edema foot to above knee. Skin Integrity:          Pt with hyperkeratosis, lymphostatic fibrosis;  no hyperpigmentation, ulceration or weeping. Edema/Girth:  3+ and pitting    Left Right    Initial Most Recent Initial Most Recent   Lower  Extremity  189.5 cm  Foot to above knee circumferencial volumetric measuements                   Physical Skills Involved:  1. Edema  2. Skin Integrity  3. Limb heaviness Cognitive Skills Affected (resulting in the inability to perform in a timely and safe manner):  1. Short Term Recall Psychosocial Skills Affected:  1. Habits/Routines   Number of elements that affect the Plan of Care: 3-5:  MODERATE COMPLEXITY   CLINICAL DECISION MAKING:   Outcome Measure: Tool Used: Lymphedema Life Impact Scale   Score:  Initial: 0 Most Recent: X (Date: -- )   Interpretation of Score: The Lymphedema Life Impact Scale (LLIS) is a validated instrument that measures the physical, functional, and psychosocial concerns pertinent to patients with extremity lymphedema. The Scale's questionnaire is administered to patients to gauge impairments, activity limitations, and participation restrictions resulting from their lymphedema. Score 0 1-13 14-26 27-40 41-54 55-67 68   Modifier CH CI CJ CK CL CM CN     ?  Other PT/OT Primary Functional Limitations:     - CURRENT STATUS:  - 0% impaired, limited or restricted    - GOAL STATUS: CH - 0% impaired, limited or restricted    - D/C STATUS:  ---------------To be determined---------------      Medical Necessity:   · Patient is expected to demonstrate progress in reduction and stabilization of L LE lymphedema to decrease risk for cellulitis. Reason for Services/Other Comments:  · Patient continues to require skilled intervention due to stage 2 L LE lymphedema. Assessment process, impact of co-morbidities, assessment modification\need for assistance, and selection of interventions: Analytical Complexity:MODERATE COMPLEXITY   TREATMENT:   (In addition to Assessment/Re-Assessment sessions the following treatments were rendered)    Pre-treatment Symptoms/Complaints:  Pt tolerating compression; removing to exercise at pool and then wearing compression thigh high. Good compliance  Pain: Initial: Pain Intensity 1: 0  Post Session:  0       Occupational Therapy Evaluation (x) OT eval completed. Pt received information on lymphedema and risk reduction/self management practices as outlined by the National Lymphedema Network. Therapeutic Exercise: ():      Manual Therapy:nn  60 minutes): Mabeline Sink Pt arrived with thigh high on. Manual lymphatic drainage with facilitation of cervical, axillary, inguinal collectors prior to decongestion of L LE. Pump trial x 20 minutes. Pt may benefit from pump for home use due to chronic nature of lymphedema in L LE. Multilayer bandaging using surgigrip and 2 short stretch bandages from foot to knee. Pt to wear compression bandages 2-3 days then remove for hygiene. Pt will wear thigh high and RTC next week for complete decongestive therapy . Treatment/Session Assessment:    · Response to Treatment:  Tolerated without complication. Pt has purchased Eucerin lotion and is using at home. Waiting to hear back from [employer] regarding pump  · Compliance with Program/Exercises:  Good compliance  · Recommendations/Intent for next treatment session: \"Next visit will focus on complete decongestive therapy\".   Total Treatment Duration:OT Patient Time In/Time Out  Time In: 1200  Time Out: Delilah Wen 96, OT

## 2017-12-29 ENCOUNTER — HOSPITAL ENCOUNTER (OUTPATIENT)
Dept: PHYSICAL THERAPY | Age: 82
Discharge: HOME | End: 2017-12-29
Payer: MEDICARE

## 2017-12-29 PROCEDURE — 97140 MANUAL THERAPY 1/> REGIONS: CPT

## 2017-12-29 NOTE — PROGRESS NOTES
Nikolas Lafe  : 1935  [place]  Secondary: [address] at [facility]  [address], [address]  [phone]   [phone] [phone]         OUTPATIENT OCCUPATIONAL THERAPY: Daily Note 2017    ICD-10: Treatment Diagnosis: I89.0, lymphedema not elsewhere classified  Precautions/Allergies:   Review of patient's allergies indicates no known allergies. Fall Risk Score: 0 (? 5 = High Risk)  MD Orders: Evaluate and treat MEDICAL/REFERRING DIAGNOSIS:   Lymphedema, not elsewhere classified [I89.0]   DATE OF ONSET:  Chronic   REFERRING PHYSICIAN: Gennaro Harden NP  RETURN PHYSICIAN APPOINTMENT: Not provided     INITIAL ASSESSMENT:   Ms. Robel Lai presents with L LE lymphedema, with reported increase in swelling the past 4 months. Pt stated she had ovarian cancer in  with removal of lymph nodes, chemotherapy but no radiation. She has history of R total knee replacement. She recently saw a vein specialist, who sent her to us for complete decongestive therapy. Upon medical record review, pt had lymphedema therapy evaluation in , at which time she reported having swelling in her left leg for \"8 years. \"  Pt and  reported that pt has not had any treatment for her lymphedema. She presents with hyperkeratosis, fibrosis, 3+/5 pitting edema. Pt has purchased thigh highs but has difficulty donning and doffing the stockings. They are here to learn to manage lymphedema, reduce girth and improve functional mobility. PLAN OF CARE:   PROBLEM LIST:  1.  Edema/girth  2. Limb heaviness   INTERVENTIONS PLANNED:  Hygiene training; skin integrity management  Manual therapy training; manual lymphatic drainage  Therapeutic exercise  Multilayer bandaging  Compression garment fitting and management  Kineseotape/compression pump prn     TREATMENT PLAN:  Effective Dates: 17 TO 18.   Frequency/Duration:  Two times a week for 12 weeks; upon reassessment will adjust frequency and duration as necessary. GOALS: (Goals have been discussed and agreed upon with patient.)  Short-Term Functional Goals: Time Frame: 6 weeks  1. Patient will verbalize understanding of lymphedema precautions  2. Patient will be independent in skin care to reduce risk of cellulitis  3. Patient will be independent with home exercise program  4. Patient will tolerate multilayer compression bandaging to aid in edema reduction  Discharge Goals: Time Frame: 12 weeks  1..  Pt will have reduction and stabilization of circumferencial volumetric graph measurements left lower extremeity  1. Patient is independent with donning and doffing compression garments for long term management  3. Patient is independent with home management of lymphedema    Rehabilitation Potential For Stated Goals: Gregg Cruz, CHRISTELLER/DEAN, CLT                 The information in this section was collected on 12/29/2017   (except where otherwise noted). OCCUPATIONAL PROFILE & HISTORY:   History of Present Injury/Illness (Reason for Referral):   Ms. Jian Marrero presents with L LE lymphedema, with reported increase in swelling the past 4 months. Pt stated she had ovarian cancer in 2000 with chemotherapy but no radiation. She has history of R total knee replacement. She recently saw a vein specialist, who sent her to us for complete decongestive therapy. Upon medical record review, pt had lymphedema treatment in 2014, at which time she reported having swelling in her left leg for \"8 years. \"  Pt and  reported that pt has not had any treatment for her lymphedema and that it has been present for 4 to 6 months. Pt has purchased thigh highs but has difficulty donning and doffing the stockings. They are here to learn to manage lymphedema, reduce girth and improve functional mobility.     Past Medical History/Comorbidities:   Ms. Jian Marrero  has a past medical history of Childhood asthma; Chronic pain of right knee; Cognitive impairment; DJD (degenerative joint disease) of knee; H/O: whooping cough; History of ovarian cancer (1999); History of scarlet fever (\"one day\" as a child); Hypertension; Memory loss; and Sigmoid diverticulosis. She also has no past medical history of Adverse effect of anesthesia; Difficult intubation; Malignant hyperthermia due to anesthesia; Nausea & vomiting; or Pseudocholinesterase deficiency. Ms. Jian Marrero  has a past surgical history that includes hx cholecystectomy; hx appendectomy; hx total abdominal hysterectomy; hx tonsillectomy (1948); hx knee arthroscopy (Right, 2016); and hx knee replacement (Right, 12/2016). Social History/Living Environment:   Pt lives with her spouse. She swims 2-3 days a week  Prior Level of Function/Work/Activity:  Independent; retired    Current Medications:    Current Outpatient Prescriptions:     donepezil (ARICEPT) 10 mg tablet, Take 10 mg by mouth nightly., Disp: , Rfl:     lisinopril (PRINIVIL, ZESTRIL) 20 mg tablet, TAKE ONE TABLET BY MOUTH ONCE DAILY, Disp: 90 Tab, Rfl: 3    Comp. Stocking,Thigh,Reg,Medium misc, One pair thigh high stocking- please measure to fit, Disp: 1 Box, Rfl: 1    CALCIUM CARBONATE/VITAMIN D3 (CALCIUM + D PO), Take  by mouth every morning. Indications: hold until after surgery, Disp: , Rfl:     ranitidine (ZANTAC) 150 mg tablet, Take 150 mg by mouth two (2) times a day., Disp: , Rfl:     ascorbic acid (VITAMIN C) 500 mg tablet, Take 500 mg by mouth every morning. Indications: hold until after surgery, Disp: , Rfl:     multivitamin (ONE A DAY) tablet, Take 1 Tab by mouth every morning. Indications: hold until after surgery, Disp: , Rfl:     CYANOCOBALAMIN, VITAMIN B-12, (VITAMIN B-12 PO), Take  by mouth every morning. Indications: hold until after surgery, Disp: , Rfl:     GLUCOSAMINE HCL/CHONDR CARTER A NA (GLUCOSAMINE-CHONDROITIN) 750-600 mg tab, Take  by mouth every morning.  Indications: hold until after surgery, Disp: , Rfl:     aspirin 81 mg tablet, Take 81 mg by mouth every morning. Indications: continue, Disp: , Rfl:             Date Last Reviewed:  12/29/2017     Complexity Level : Expanded review of therapy/medical records (1-2):  MODERATE COMPLEXITY   ASSESSMENT OF OCCUPATIONAL PERFORMANCE:   Palpation:          Pt with 3+/5 pitting edema L LE; edema foot to above knee. Skin Integrity:          Pt with hyperkeratosis, lymphostatic fibrosis;  no hyperpigmentation, ulceration or weeping. Edema/Girth:  3+ and pitting    Left Right    Initial Most Recent Initial Most Recent   Lower  Extremity  189.5 cm  Foot to above knee circumferencial volumetric measuements                   Physical Skills Involved:  1. Edema  2. Skin Integrity  3. Limb heaviness Cognitive Skills Affected (resulting in the inability to perform in a timely and safe manner):  1. Short Term Recall Psychosocial Skills Affected:  1. Habits/Routines   Number of elements that affect the Plan of Care: 3-5:  MODERATE COMPLEXITY   CLINICAL DECISION MAKING:   Outcome Measure: Tool Used: Lymphedema Life Impact Scale   Score:  Initial: 0 Most Recent: X (Date: -- )   Interpretation of Score: The Lymphedema Life Impact Scale (LLIS) is a validated instrument that measures the physical, functional, and psychosocial concerns pertinent to patients with extremity lymphedema. The Scale's questionnaire is administered to patients to gauge impairments, activity limitations, and participation restrictions resulting from their lymphedema. Score 0 1-13 14-26 27-40 41-54 55-67 68   Modifier CH CI CJ CK CL CM CN     ?  Other PT/OT Primary Functional Limitations:     - CURRENT STATUS:  - 0% impaired, limited or restricted    - GOAL STATUS: CH - 0% impaired, limited or restricted    - D/C STATUS:  ---------------To be determined---------------      Medical Necessity:   · Patient is expected to demonstrate progress in reduction and stabilization of L LE lymphedema to decrease risk for cellulitis. Reason for Services/Other Comments:  · Patient continues to require skilled intervention due to stage 2 L LE lymphedema. Assessment process, impact of co-morbidities, assessment modification\need for assistance, and selection of interventions: Analytical Complexity:MODERATE COMPLEXITY   TREATMENT:   (In addition to Assessment/Re-Assessment sessions the following treatments were rendered)    Pre-treatment Symptoms/Complaints:  Pt met with [employer] rep today to assess pump for home use. Pain: Initial: Pain Intensity 1: 0  Post Session:  0       Occupational Therapy Evaluation (x) OT eval completed. Pt received information on lymphedema and risk reduction/self management practices as outlined by the National Lymphedema Network. Therapeutic Exercise: ():      Manual Therapy:  (45 minutes):   Manual lymphatic drainage with facilitation of cervical, axillary, inguinal collectors prior to decongestion of L LE. Following session with [employer] rep, therapist applied multilayer bandage using surgigrip and 2 short stretch bandages from foot to knee. Pt to wear compression bandages 2-3 days then remove for hygiene. Pt will wear thigh high and RTC next week for complete decongestive therapy . Treatment/Session Assessment:    · Response to Treatment:  Tolerated without complication. Pt has purchased Eucerin lotion and is using at home. Waiting to hear back from [employer] regarding pump  · Compliance with Program/Exercises:  Good compliance  · Recommendations/Intent for next treatment session: \"Next visit will focus on complete decongestive therapy\".   Total Treatment Duration:OT Patient Time In/Time Out  Time In: 1100  Time Out: 0310 [address]

## 2018-01-02 ENCOUNTER — HOSPITAL ENCOUNTER (OUTPATIENT)
Dept: PHYSICAL THERAPY | Age: 83
Discharge: HOME | End: 2018-01-02
Payer: MEDICARE

## 2018-01-02 PROCEDURE — 97140 MANUAL THERAPY 1/> REGIONS: CPT

## 2018-01-04 ENCOUNTER — HOSPITAL ENCOUNTER (OUTPATIENT)
Dept: PHYSICAL THERAPY | Age: 83
Discharge: HOME | End: 2018-01-04
Payer: MEDICARE

## 2018-01-04 PROCEDURE — 97140 MANUAL THERAPY 1/> REGIONS: CPT

## 2018-01-04 NOTE — PROGRESS NOTES
Sumi Wadsworth  : 1935  [place]  Secondary: [address] at [facility]  [address], [address]  [phone]   [phone]         OUTPATIENT OCCUPATIONAL THERAPY: Daily Note 2018    ICD-10: Treatment Diagnosis: I89.0, lymphedema not elsewhere classified  Precautions/Allergies:   Review of patient's allergies indicates no known allergies. Fall Risk Score: 0 (? 5 = High Risk)  MD Orders: Evaluate and treat MEDICAL/REFERRING DIAGNOSIS:   Lymphedema, not elsewhere classified [I89.0]   DATE OF ONSET:  Chronic   REFERRING PHYSICIAN: Saleem Harden NP  RETURN PHYSICIAN APPOINTMENT: Not provided     INITIAL ASSESSMENT:   Ms. Dannie Halsted presents with L LE lymphedema, with reported increase in swelling the past 4 months. Pt stated she had ovarian cancer in  with removal of lymph nodes, chemotherapy but no radiation. She has history of R total knee replacement. She recently saw a vein specialist, who sent her to us for complete decongestive therapy. Upon medical record review, pt had lymphedema therapy evaluation in , at which time she reported having swelling in her left leg for \"8 years. \"  Pt and  reported that pt has not had any treatment for her lymphedema. She presents with hyperkeratosis, fibrosis, 3+/5 pitting edema. Pt has purchased thigh highs but has difficulty donning and doffing the stockings. They are here to learn to manage lymphedema, reduce girth and improve functional mobility. PLAN OF CARE:   PROBLEM LIST:  1.  Edema/girth  2. Limb heaviness   INTERVENTIONS PLANNED:  Hygiene training; skin integrity management  Manual therapy training; manual lymphatic drainage  Therapeutic exercise  Multilayer bandaging  Compression garment fitting and management  Kineseotape/compression pump prn     TREATMENT PLAN:  Effective Dates: 17 TO 18.   Frequency/Duration:  Two times a week for 12 weeks; upon reassessment will adjust frequency and duration as necessary. GOALS: (Goals have been discussed and agreed upon with patient.)  Short-Term Functional Goals: Time Frame: 6 weeks  1. Patient will verbalize understanding of lymphedema precautions  2. Patient will be independent in skin care to reduce risk of cellulitis  3. Patient will be independent with home exercise program  4. Patient will tolerate multilayer compression bandaging to aid in edema reduction  Discharge Goals: Time Frame: 12 weeks  1..  Pt will have reduction and stabilization of circumferencial volumetric graph measurements left lower extremeity  1. Patient is independent with donning and doffing compression garments for long term management  3. Patient is independent with home management of lymphedema    Rehabilitation Potential For Stated Goals: Good    Ruth Favor, OTR/L, CLT                 The information in this section was collected on 1/4/2018   (except where otherwise noted). OCCUPATIONAL PROFILE & HISTORY:   History of Present Injury/Illness (Reason for Referral):   Ms. Maria Ines Wallis presents with L LE lymphedema, with reported increase in swelling the past 4 months. Pt stated she had ovarian cancer in 2000 with chemotherapy but no radiation. She has history of R total knee replacement. She recently saw a vein specialist, who sent her to us for complete decongestive therapy. Upon medical record review, pt had lymphedema treatment in 2014, at which time she reported having swelling in her left leg for \"8 years. \"  Pt and  reported that pt has not had any treatment for her lymphedema and that it has been present for 4 to 6 months. Pt has purchased thigh highs but has difficulty donning and doffing the stockings. They are here to learn to manage lymphedema, reduce girth and improve functional mobility.     Past Medical History/Comorbidities:   Ms. Maria Ines Wallis  has a past medical history of Childhood asthma; Chronic pain of right knee; Cognitive impairment; DJD (degenerative joint disease) of knee; H/O: whooping cough; History of ovarian cancer (1999); History of scarlet fever (\"one day\" as a child); Hypertension; Memory loss; and Sigmoid diverticulosis. She also has no past medical history of Adverse effect of anesthesia; Difficult intubation; Malignant hyperthermia due to anesthesia; Nausea & vomiting; or Pseudocholinesterase deficiency. Ms. Mariaa Hall  has a past surgical history that includes hx cholecystectomy; hx appendectomy; hx total abdominal hysterectomy; hx tonsillectomy (1948); hx knee arthroscopy (Right, 2016); and hx knee replacement (Right, 12/2016). Social History/Living Environment:   Pt lives with her spouse. She swims 2-3 days a week  Prior Level of Function/Work/Activity:  Independent; retired    Current Medications:    Current Outpatient Prescriptions:     donepezil (ARICEPT) 10 mg tablet, Take 10 mg by mouth nightly., Disp: , Rfl:     lisinopril (PRINIVIL, ZESTRIL) 20 mg tablet, TAKE ONE TABLET BY MOUTH ONCE DAILY, Disp: 90 Tab, Rfl: 3    Comp. Stocking,Thigh,Reg,Medium misc, One pair thigh high stocking- please measure to fit, Disp: 1 Box, Rfl: 1    CALCIUM CARBONATE/VITAMIN D3 (CALCIUM + D PO), Take  by mouth every morning. Indications: hold until after surgery, Disp: , Rfl:     ranitidine (ZANTAC) 150 mg tablet, Take 150 mg by mouth two (2) times a day., Disp: , Rfl:     ascorbic acid (VITAMIN C) 500 mg tablet, Take 500 mg by mouth every morning. Indications: hold until after surgery, Disp: , Rfl:     multivitamin (ONE A DAY) tablet, Take 1 Tab by mouth every morning. Indications: hold until after surgery, Disp: , Rfl:     CYANOCOBALAMIN, VITAMIN B-12, (VITAMIN B-12 PO), Take  by mouth every morning. Indications: hold until after surgery, Disp: , Rfl:     GLUCOSAMINE HCL/CHONDR CARTER A NA (GLUCOSAMINE-CHONDROITIN) 750-600 mg tab, Take  by mouth every morning.  Indications: hold until after surgery, Disp: , Rfl:     aspirin 81 mg tablet, Take 81 mg by mouth every morning. Indications: continue, Disp: , Rfl:             Date Last Reviewed:  1/4/2018     Complexity Level : Expanded review of therapy/medical records (1-2):  MODERATE COMPLEXITY   ASSESSMENT OF OCCUPATIONAL PERFORMANCE:   Palpation:          Pt with 3+/5 pitting edema L LE; edema foot to above knee. Skin Integrity:          Pt with hyperkeratosis, lymphostatic fibrosis;  no hyperpigmentation, ulceration or weeping. Edema/Girth:  3+ and pitting    Left Right    Initial Most Recent Initial Most Recent   Lower  Extremity  189.5 cm  Foot to above knee circumferencial volumetric measuements                   Physical Skills Involved:  1. Edema  2. Skin Integrity  3. Limb heaviness Cognitive Skills Affected (resulting in the inability to perform in a timely and safe manner):  1. Short Term Recall Psychosocial Skills Affected:  1. Habits/Routines   Number of elements that affect the Plan of Care: 3-5:  MODERATE COMPLEXITY   CLINICAL DECISION MAKING:   Outcome Measure: Tool Used: Lymphedema Life Impact Scale   Score:  Initial: 0 Most Recent: X (Date: -- )   Interpretation of Score: The Lymphedema Life Impact Scale (LLIS) is a validated instrument that measures the physical, functional, and psychosocial concerns pertinent to patients with extremity lymphedema. The Scale's questionnaire is administered to patients to gauge impairments, activity limitations, and participation restrictions resulting from their lymphedema. Score 0 1-13 14-26 27-40 41-54 55-67 68   Modifier CH CI CJ CK CL CM CN     ?  Other PT/OT Primary Functional Limitations:     - CURRENT STATUS: CH - 0% impaired, limited or restricted    - GOAL STATUS: CH - 0% impaired, limited or restricted    - D/C STATUS:  ---------------To be determined---------------      Medical Necessity:   · Patient is expected to demonstrate progress in reduction and stabilization of L LE lymphedema to decrease risk for cellulitis. Reason for Services/Other Comments:  · Patient continues to require skilled intervention due to stage 2 L LE lymphedema. Assessment process, impact of co-morbidities, assessment modification\need for assistance, and selection of interventions: Analytical Complexity:MODERATE COMPLEXITY   TREATMENT:   (In addition to Assessment/Re-Assessment sessions the following treatments were rendered)    Pre-treatment Symptoms/Complaints:  Pt met with [employer] rep today to assess pump for home use. Pain: Initial: Pain Intensity 1: 0  Post Session:  0       Occupational Therapy Evaluation (x) OT eval completed. Pt received information on lymphedema and risk reduction/self management practices as outlined by the National Lymphedema Network. Therapeutic Exercise: ():      Manual Therapy:  (45 minutes):   Manual lymphatic drainage with facilitation of cervical, axillary, inguinal collectors prior to decongestion of L LE. Entre pump trial x 10 minutes. Applied Eucerin lotion and multilayer bandage using surgigrip and 2 short stretch bandages from foot to knee. Pt to wear compression bandages 2-3 days then remove for hygiene/pool exercises. .  Pt will wear thigh high and RTC next week for complete decongestive therapy . Pt with ongoing thigh fluid congestion and will benefit from Flexitouch pump to assist with inguinal node stimulation and facilitation of lymphatic flow in upper leg. Treatment/Session Assessment:    · Response to Treatment:  Tolerated without complication. Pt has purchased Eucerin lotion and is using at home. Waiting to hear back from [employer] regarding pump  · Compliance with Program/Exercises:  Good compliance  · Recommendations/Intent for next treatment session: \"Next visit will focus on complete decongestive therapy\".   Total Treatment Duration:OT Patient Time In/Time Out  Time In: 8648  Time Out: [address], OT

## 2018-01-15 ENCOUNTER — HOSPITAL ENCOUNTER (OUTPATIENT)
Dept: PHYSICAL THERAPY | Age: 83
Discharge: HOME | End: 2018-01-15
Payer: MEDICARE

## 2018-01-15 PROCEDURE — 97140 MANUAL THERAPY 1/> REGIONS: CPT

## 2018-01-15 NOTE — PROGRESS NOTES
Urvashi Minaya  : 1935  [place]  Secondary: [address] at [facility]  [address], [address]  [phone]   [phone]         OUTPATIENT OCCUPATIONAL THERAPY: Daily Note 1/15/2018    ICD-10: Treatment Diagnosis: I89.0, lymphedema not elsewhere classified  Precautions/Allergies:   Review of patient's allergies indicates no known allergies. Fall Risk Score: 0 (? 5 = High Risk)  MD Orders: Evaluate and treat MEDICAL/REFERRING DIAGNOSIS:   Lymphedema, not elsewhere classified [I89.0]   DATE OF ONSET:  Chronic   REFERRING PHYSICIAN: Sam Harden NP  RETURN PHYSICIAN APPOINTMENT: Not provided     INITIAL ASSESSMENT:   Ms. Sid Denis presents with L LE lymphedema, with reported increase in swelling the past 4 months. Pt stated she had ovarian cancer in  with removal of lymph nodes, chemotherapy but no radiation. She has history of R total knee replacement. She recently saw a vein specialist, who sent her to us for complete decongestive therapy. Upon medical record review, pt had lymphedema therapy evaluation in , at which time she reported having swelling in her left leg for \"8 years. \"  Pt and  reported that pt has not had any treatment for her lymphedema. She presents with hyperkeratosis, fibrosis, 3+/5 pitting edema. Pt has purchased thigh highs but has difficulty donning and doffing the stockings. They are here to learn to manage lymphedema, reduce girth and improve functional mobility. PLAN OF CARE:   PROBLEM LIST:  1.  Edema/girth  2. Limb heaviness   INTERVENTIONS PLANNED:  Hygiene training; skin integrity management  Manual therapy training; manual lymphatic drainage  Therapeutic exercise  Multilayer bandaging  Compression garment fitting and management  Kineseotape/compression pump prn     TREATMENT PLAN:  Effective Dates: 17 TO 18.   Frequency/Duration:  Two times a week for 12 weeks; upon reassessment will adjust frequency and duration as necessary. GOALS: (Goals have been discussed and agreed upon with patient.)  Short-Term Functional Goals: Time Frame: 6 weeks  1. Patient will verbalize understanding of lymphedema precautions  2. Patient will be independent in skin care to reduce risk of cellulitis  3. Patient will be independent with home exercise program  4. Patient will tolerate multilayer compression bandaging to aid in edema reduction  Discharge Goals: Time Frame: 12 weeks  1..  Pt will have reduction and stabilization of circumferencial volumetric graph measurements left lower extremeity  1. Patient is independent with donning and doffing compression garments for long term management  3. Patient is independent with home management of lymphedema    Rehabilitation Potential For Stated Goals: Gregg Mendoza, CHRISTELLER/DEAN, CLT                 The information in this section was collected on 1/15/2018   (except where otherwise noted). OCCUPATIONAL PROFILE & HISTORY:   History of Present Injury/Illness (Reason for Referral):   Ms. Roxana Cabrales presents with L LE lymphedema, with reported increase in swelling the past 4 months. Pt stated she had ovarian cancer in 2000 with chemotherapy but no radiation. She has history of R total knee replacement. She recently saw a vein specialist, who sent her to us for complete decongestive therapy. Upon medical record review, pt had lymphedema treatment in 2014, at which time she reported having swelling in her left leg for \"8 years. \"  Pt and  reported that pt has not had any treatment for her lymphedema and that it has been present for 4 to 6 months. Pt has purchased thigh highs but has difficulty donning and doffing the stockings. They are here to learn to manage lymphedema, reduce girth and improve functional mobility.     Past Medical History/Comorbidities:   Ms. Roxana Cabrales  has a past medical history of Childhood asthma; Chronic pain of right knee; Cognitive impairment; DJD (degenerative joint disease) of knee; H/O: whooping cough; History of ovarian cancer (1999); History of scarlet fever (\"one day\" as a child); Hypertension; Memory loss; and Sigmoid diverticulosis. She also has no past medical history of Adverse effect of anesthesia; Difficult intubation; Malignant hyperthermia due to anesthesia; Nausea & vomiting; or Pseudocholinesterase deficiency. Ms. Maciel Anglin  has a past surgical history that includes hx cholecystectomy; hx appendectomy; hx total abdominal hysterectomy; hx tonsillectomy (1948); hx knee arthroscopy (Right, 2016); and hx knee replacement (Right, 12/2016). Social History/Living Environment:   Pt lives with her spouse. She swims 2-3 days a week  Prior Level of Function/Work/Activity:  Independent; retired    Current Medications:    Current Outpatient Prescriptions:     donepezil (ARICEPT) 10 mg tablet, Take 10 mg by mouth nightly., Disp: , Rfl:     lisinopril (PRINIVIL, ZESTRIL) 20 mg tablet, TAKE ONE TABLET BY MOUTH ONCE DAILY, Disp: 90 Tab, Rfl: 3    Comp. Stocking,Thigh,Reg,Medium misc, One pair thigh high stocking- please measure to fit, Disp: 1 Box, Rfl: 1    CALCIUM CARBONATE/VITAMIN D3 (CALCIUM + D PO), Take  by mouth every morning. Indications: hold until after surgery, Disp: , Rfl:     ranitidine (ZANTAC) 150 mg tablet, Take 150 mg by mouth two (2) times a day., Disp: , Rfl:     ascorbic acid (VITAMIN C) 500 mg tablet, Take 500 mg by mouth every morning. Indications: hold until after surgery, Disp: , Rfl:     multivitamin (ONE A DAY) tablet, Take 1 Tab by mouth every morning. Indications: hold until after surgery, Disp: , Rfl:     CYANOCOBALAMIN, VITAMIN B-12, (VITAMIN B-12 PO), Take  by mouth every morning. Indications: hold until after surgery, Disp: , Rfl:     GLUCOSAMINE HCL/CHONDR CARTER A NA (GLUCOSAMINE-CHONDROITIN) 750-600 mg tab, Take  by mouth every morning.  Indications: hold until after surgery, Disp: , Rfl:     aspirin 81 mg tablet, Take 81 mg by mouth every morning. Indications: continue, Disp: , Rfl:             Date Last Reviewed:  1/15/2018     Complexity Level : Expanded review of therapy/medical records (1-2):  MODERATE COMPLEXITY   ASSESSMENT OF OCCUPATIONAL PERFORMANCE:   Palpation:          Pt with 3+/5 pitting edema L LE; edema foot to above knee. Skin Integrity:          Pt with hyperkeratosis, lymphostatic fibrosis;  no hyperpigmentation, ulceration or weeping. Edema/Girth:  3+ and pitting    Left Right    Initial Most Recent Initial Most Recent   Lower  Extremity  189.5 cm  Foot to above knee circumferencial volumetric measuements                   Physical Skills Involved:  1. Edema  2. Skin Integrity  3. Limb heaviness Cognitive Skills Affected (resulting in the inability to perform in a timely and safe manner):  1. Short Term Recall Psychosocial Skills Affected:  1. Habits/Routines   Number of elements that affect the Plan of Care: 3-5:  MODERATE COMPLEXITY   CLINICAL DECISION MAKING:   Outcome Measure: Tool Used: Lymphedema Life Impact Scale   Score:  Initial: 0 Most Recent: X (Date: -- )   Interpretation of Score: The Lymphedema Life Impact Scale (LLIS) is a validated instrument that measures the physical, functional, and psychosocial concerns pertinent to patients with extremity lymphedema. The Scale's questionnaire is administered to patients to gauge impairments, activity limitations, and participation restrictions resulting from their lymphedema. Score 0 1-13 14-26 27-40 41-54 55-67 68   Modifier CH CI CJ CK CL CM CN     ?  Other PT/OT Primary Functional Limitations:     - CURRENT STATUS:  - 0% impaired, limited or restricted    - GOAL STATUS: CH - 0% impaired, limited or restricted    - D/C STATUS:  ---------------To be determined---------------      Medical Necessity:   · Patient is expected to demonstrate progress in reduction and stabilization of L LE lymphedema to decrease risk for cellulitis. Reason for Services/Other Comments:  · Patient continues to require skilled intervention due to stage 2 L LE lymphedema. Assessment process, impact of co-morbidities, assessment modification\need for assistance, and selection of interventions: Analytical Complexity:MODERATE COMPLEXITY   TREATMENT:   (In addition to Assessment/Re-Assessment sessions the following treatments were rendered)    Pre-treatment Symptoms/Complaints:  Pt received pump; leg sleeve is too long. Called rep to replace with short leg Entre sleeve. Pain: Initial: Pain Intensity 1: 0  Post Session:  0       Occupational Therapy Evaluation (x) OT eval completed. Pt received information on lymphedema and risk reduction/self management practices as outlined by the National Lymphedema Network. Therapeutic Exercise: ():      Manual Therapy:  (45 minutes):   Manual lymphatic drainage with facilitation of cervical, axillary, inguinal collectors prior to decongestion of L LE. Entre pump trial x 10 minutes. Applied Eucerin lotion and multilayer bandage using surgigrip and 2 short stretch bandages from foot to knee. Pt to wear compression bandages 2-3 days then remove for hygiene/pool exercises. .  Pt will wear thigh high and RTC next week for complete decongestive therapy . Received Entre pump at home; needs sleeve replacement to smaller size. Treatment/Session Assessment:    · Response to Treatment:  Tolerated without complication. Pt has purchased Eucerin lotion and is using at home. Waiting to hear back from [employer] regarding pump  · Compliance with Program/Exercises:  Good compliance  · Recommendations/Intent for next treatment session: \"Next visit will focus on complete decongestive therapy\".     Total Treatment Duration:OT Patient Time In/Time Out  Time In: 7111  Time Out: [address], OT

## 2018-03-27 NOTE — PROGRESS NOTES
Harriet Barragan  : 1935  [place]  Secondary: [address] at [facility]  [address], [address]  [phone]   [phone]         OUTPATIENT OCCUPATIONAL THERAPY: Discontinuation Summary 3/27/18    ICD-10: Treatment Diagnosis: I89.0, lymphedema not elsewhere classified  Precautions/Allergies:   Review of patient's allergies indicates no known allergies. Fall Risk Score: 0 (? 5 = High Risk)  MD Orders: Evaluate and treat MEDICAL/REFERRING DIAGNOSIS:   Lymphedema, not elsewhere classified [I89.0]   DATE OF ONSET:  Chronic   REFERRING PHYSICIAN: Florin Harden NP  RETURN PHYSICIAN APPOINTMENT: Not provided     DISCONTINUATION SUMMARY:  Ms. Daisy Becker completed 8 treatment sessions. She did not return after last visit 1/15/18. Pt was educated on lymphatic pathophysiology, complete decongestive therapy, precautions and skin integrity management. She received an Ola pump for home use and was wearing compression hose daily. She routinely exercises in the swimming pool. Measurements were not done to assess reduction of circumferencial volumetric measurments. Pt was independent with home care and her  was educated as well. Discontinue services at this time      INITIAL ASSESSMENT:   Ms. Daisy Becker presents with L LE lymphedema, with reported increase in swelling the past 4 months. Pt stated she had ovarian cancer in  with removal of lymph nodes, chemotherapy but no radiation. She has history of R total knee replacement. She recently saw a vein specialist, who sent her to us for complete decongestive therapy. Upon medical record review, pt had lymphedema therapy evaluation in , at which time she reported having swelling in her left leg for \"8 years. \"  Pt and  reported that pt has not had any treatment for her lymphedema. She presents with hyperkeratosis, fibrosis, 3+/5 pitting edema. Pt has purchased thigh highs but has difficulty donning and doffing the stockings. They are here to learn to manage lymphedema, reduce girth and improve functional mobility. PLAN OF CARE:   PROBLEM LIST:  1.  Edema/girth  2. Limb heaviness   INTERVENTIONS PLANNED:  Hygiene training; skin integrity management  Manual therapy training; manual lymphatic drainage  Therapeutic exercise  Multilayer bandaging  Compression garment fitting and management  Kineseotape/compression pump prn     TREATMENT PLAN:  Effective Dates: 11/29/17 TO 2/21/18. Frequency/Duration:  Two times a week for 12 weeks; upon reassessment will adjust frequency and duration as necessary. GOALS: (Goals have been discussed and agreed upon with patient.)  Short-Term Functional Goals: Time Frame: 6 weeks  1. Patient will verbalize understanding of lymphedema precautions  Goal met  2. Patient will be independent in skin care to reduce risk of cellulitis  Goal met  3. Patient will be independent with home exercise program  Goal met  4. Patient will tolerate multilayer compression bandaging to aid in edema reduction  Goal met  Discharge Goals: Time Frame: 12 weeks  1..  Pt will have reduction and stabilization of circumferencial volumetric graph measurements left lower extremity  Not assessed  1. Patient is independent with donning and doffing compression garments for long term management  Goal met  3.   Patient is independent with home management of lymphedema  Goal met    Rehabilitation Potential For Stated Goals: CANDIE Aviles/DEAN, CLT

## 2019-02-27 ENCOUNTER — HOSPITAL ENCOUNTER (OUTPATIENT)
Dept: MAMMOGRAPHY | Age: 84
Discharge: HOME | End: 2019-02-27
Attending: FAMILY MEDICINE
Payer: MEDICARE

## 2019-02-27 DIAGNOSIS — Z12.31 VISIT FOR SCREENING MAMMOGRAM: ICD-10-CM

## 2019-02-27 PROCEDURE — 77067 SCR MAMMO BI INCL CAD: CPT

## 2019-04-15 ENCOUNTER — HOSPITAL ENCOUNTER (OUTPATIENT)
Dept: LAB | Age: 84
Discharge: HOME | End: 2019-04-15
Payer: MEDICARE

## 2019-04-15 DIAGNOSIS — E03.9 ACQUIRED HYPOTHYROIDISM: ICD-10-CM

## 2019-04-15 LAB
ANION GAP SERPL CALC-SCNC: 4 MMOL/L
BASOPHILS # BLD: 0.1 K/UL (ref 0–0.2)
BASOPHILS NFR BLD: 1 % (ref 0–2)
BUN SERPL-MCNC: 17 MG/DL (ref 8–23)
CALCIUM SERPL-MCNC: 9.1 MG/DL (ref 8.3–10.4)
CHLORIDE SERPL-SCNC: 106 MMOL/L (ref 98–107)
CHOLEST SERPL-MCNC: 213 MG/DL
CO2 SERPL-SCNC: 29 MMOL/L (ref 21–32)
CREAT SERPL-MCNC: 0.92 MG/DL (ref 0.6–1)
DIFFERENTIAL METHOD BLD: ABNORMAL
EOSINOPHIL # BLD: 0.2 K/UL (ref 0–0.8)
EOSINOPHIL NFR BLD: 3 % (ref 0.5–7.8)
ERYTHROCYTE [DISTWIDTH] IN BLOOD BY AUTOMATED COUNT: 14.7 % (ref 11.9–14.6)
GLUCOSE SERPL-MCNC: 93 MG/DL (ref 65–100)
HCT VFR BLD AUTO: 39.9 % (ref 35.8–46.3)
HDLC SERPL-MCNC: 76 MG/DL (ref 40–60)
HDLC SERPL: 2.8 {RATIO}
HGB BLD-MCNC: 12.3 G/DL (ref 11.7–15.4)
IMM GRANULOCYTES # BLD AUTO: 0.1 K/UL (ref 0–0.5)
IMM GRANULOCYTES NFR BLD AUTO: 1 % (ref 0–5)
LDLC SERPL CALC-MCNC: 104.8 MG/DL
LIPID PROFILE,FLP: ABNORMAL
LYMPHOCYTES # BLD: 1.3 K/UL (ref 0.5–4.6)
LYMPHOCYTES NFR BLD: 16 % (ref 13–44)
MCH RBC QN AUTO: 29.3 PG (ref 26.1–32.9)
MCHC RBC AUTO-ENTMCNC: 30.8 G/DL (ref 31.4–35)
MCV RBC AUTO: 95 FL (ref 79.6–97.8)
MONOCYTES # BLD: 0.7 K/UL (ref 0.1–1.3)
MONOCYTES NFR BLD: 9 % (ref 4–12)
NEUTS SEG # BLD: 6 K/UL (ref 1.7–8.2)
NEUTS SEG NFR BLD: 71 % (ref 43–78)
NRBC # BLD: 0 K/UL (ref 0–0.2)
PLATELET # BLD AUTO: 364 K/UL (ref 150–450)
PMV BLD AUTO: 10.3 FL (ref 9.4–12.3)
POTASSIUM SERPL-SCNC: 4.1 MMOL/L (ref 3.5–5.1)
RBC # BLD AUTO: 4.2 M/UL (ref 4.05–5.2)
SODIUM SERPL-SCNC: 139 MMOL/L (ref 136–145)
TRIGL SERPL-MCNC: 161 MG/DL (ref 35–150)
TSH SERPL DL<=0.005 MIU/L-ACNC: 2.02 UIU/ML
VLDLC SERPL CALC-MCNC: 32.2 MG/DL (ref 6–23)
WBC # BLD AUTO: 8.5 K/UL (ref 4.3–11.1)

## 2019-04-15 PROCEDURE — 80061 LIPID PANEL: CPT

## 2019-04-15 PROCEDURE — 84443 ASSAY THYROID STIM HORMONE: CPT

## 2019-04-15 PROCEDURE — 80048 BASIC METABOLIC PNL TOTAL CA: CPT

## 2019-04-15 PROCEDURE — 85025 COMPLETE CBC W/AUTO DIFF WBC: CPT

## 2019-04-15 PROCEDURE — 36415 COLL VENOUS BLD VENIPUNCTURE: CPT

## 2019-09-03 ENCOUNTER — HOSPITAL ENCOUNTER (OUTPATIENT)
Dept: LAB | Age: 84
Discharge: HOME | End: 2019-09-03
Attending: FAMILY MEDICINE
Payer: MEDICARE

## 2019-09-03 DIAGNOSIS — I10 ESSENTIAL HYPERTENSION: ICD-10-CM

## 2019-09-03 LAB
ANION GAP SERPL CALC-SCNC: 4 MMOL/L (ref 7–16)
BUN SERPL-MCNC: 13 MG/DL (ref 8–23)
CALCIUM SERPL-MCNC: 9.4 MG/DL (ref 8.3–10.4)
CHLORIDE SERPL-SCNC: 104 MMOL/L (ref 98–107)
CHOLEST SERPL-MCNC: 196 MG/DL
CO2 SERPL-SCNC: 31 MMOL/L (ref 21–32)
CREAT SERPL-MCNC: 0.92 MG/DL (ref 0.6–1)
GLUCOSE SERPL-MCNC: 81 MG/DL (ref 65–100)
HDLC SERPL-MCNC: 68 MG/DL (ref 40–60)
HDLC SERPL: 2.9 {RATIO}
LDLC SERPL CALC-MCNC: 107.8 MG/DL
LIPID PROFILE,FLP: ABNORMAL
POTASSIUM SERPL-SCNC: 4.4 MMOL/L (ref 3.5–5.1)
SODIUM SERPL-SCNC: 139 MMOL/L (ref 136–145)
TRIGL SERPL-MCNC: 101 MG/DL (ref 35–150)
VLDLC SERPL CALC-MCNC: 20.2 MG/DL (ref 6–23)

## 2019-09-03 PROCEDURE — 80048 BASIC METABOLIC PNL TOTAL CA: CPT

## 2019-09-03 PROCEDURE — 36415 COLL VENOUS BLD VENIPUNCTURE: CPT

## 2019-09-03 PROCEDURE — 80061 LIPID PANEL: CPT

## 2020-02-26 PROBLEM — I48.91 ATRIAL FIBRILLATION WITH RVR (HCC): Status: ACTIVE | Noted: 2020-02-26

## 2020-07-05 PROBLEM — I26.99 PULMONARY EMBOLISM (HCC): Status: ACTIVE | Noted: 2020-07-05

## 2020-07-05 PROBLEM — A41.9 SEPSIS (HCC): Status: ACTIVE | Noted: 2020-07-05

## 2020-07-17 PROBLEM — I48.92 PAROXYSMAL ATRIAL FLUTTER (HCC): Status: ACTIVE | Noted: 2020-07-17

## 2020-07-17 PROBLEM — I82.431 ACUTE DEEP VEIN THROMBOSIS (DVT) OF POPLITEAL VEIN OF RIGHT LOWER EXTREMITY (HCC): Status: ACTIVE | Noted: 2020-07-17

## 2020-07-17 PROBLEM — A41.9 SEPSIS (HCC): Status: RESOLVED | Noted: 2020-07-05 | Resolved: 2020-07-17

## 2020-07-17 PROBLEM — D72.829 LEUKOCYTOSIS: Status: ACTIVE | Noted: 2020-07-17

## 2020-08-12 PROBLEM — U07.1 PNEUMONIA DUE TO COVID-19 VIRUS: Status: ACTIVE | Noted: 2020-08-12

## 2020-08-12 PROBLEM — Z85.43 PERSONAL HISTORY OF MALIGNANT NEOPLASM OF OVARY: Status: ACTIVE | Noted: 2019-09-20

## 2020-08-12 PROBLEM — F03.90 DEMENTIA (HCC): Status: ACTIVE | Noted: 2020-08-12

## 2020-08-12 PROBLEM — J12.82 PNEUMONIA DUE TO COVID-19 VIRUS: Status: ACTIVE | Noted: 2020-08-12

## 2020-08-12 PROBLEM — R09.02 HYPOXIA: Status: ACTIVE | Noted: 2020-08-12

## 2020-08-12 PROBLEM — E87.6 HYPOKALEMIA: Status: ACTIVE | Noted: 2020-08-12

## 2020-09-16 PROBLEM — U07.1 COVID-19 VIRUS INFECTION: Status: ACTIVE | Noted: 2020-09-16
